# Patient Record
Sex: MALE | Race: ASIAN | NOT HISPANIC OR LATINO
[De-identification: names, ages, dates, MRNs, and addresses within clinical notes are randomized per-mention and may not be internally consistent; named-entity substitution may affect disease eponyms.]

---

## 2021-10-07 PROBLEM — Z00.00 ENCOUNTER FOR PREVENTIVE HEALTH EXAMINATION: Status: ACTIVE | Noted: 2021-10-07

## 2021-10-12 ENCOUNTER — APPOINTMENT (OUTPATIENT)
Dept: SURGERY | Facility: CLINIC | Age: 76
End: 2021-10-12
Payer: MEDICARE

## 2021-10-12 VITALS
SYSTOLIC BLOOD PRESSURE: 114 MMHG | DIASTOLIC BLOOD PRESSURE: 75 MMHG | BODY MASS INDEX: 23.04 KG/M2 | HEIGHT: 63 IN | HEART RATE: 79 BPM | OXYGEN SATURATION: 98 % | WEIGHT: 130 LBS | RESPIRATION RATE: 18 BRPM | TEMPERATURE: 97.8 F

## 2021-10-12 PROCEDURE — 99204 OFFICE O/P NEW MOD 45 MIN: CPT

## 2021-10-12 NOTE — HISTORY OF PRESENT ILLNESS
[de-identified] : Serbian translation via Rikki Sheehan\par \par Patient is a pleasant 76 year old male with no PMHx, distant PSHx of hemorrhoidectomy, who presents for symptomatic reducible left inguinal hernia that has been present for the past 2 years. States that due to the COVID pandemic, he has deferred treatment till now. States that the primary symptom is discomfort, worse with heavy lifting and straining. He is otherwise tolerating diet well, has normal bowel and urinary function. He denies smoking/EtOH/drug use, and has NKDA.

## 2021-10-12 NOTE — PHYSICAL EXAM
[Respiratory Effort] : normal respiratory effort [Normal Rate and Rhythm] : normal rate and rhythm [Alert] : alert [Oriented to Person] : oriented to person [Oriented to Place] : oriented to place [Oriented to Time] : oriented to time [Calm] : calm [de-identified] : NAD [de-identified] : Soft, non-distended, non-TTP in all quadrants, no rebound/guarding [de-identified] : Testes in normal anatomic position, no palpable mass, reducible left inguinal hernia, no palpable right inguinal hernia [de-identified] : No skin changes to the inguinal region

## 2021-10-12 NOTE — PLAN
[FreeTextEntry1] : \par - I spoke with the patient regarding his diagnosis and offered him robotic-assisted laparoscopic left, possible right, inguinal hernia repair with mesh, possible open\par - We discussed the indications, risks, benefits, and alternatives of the procedure, including the use of mesh, to which the patient stated he understood, gave consent, and all his questions were answered\par - Patient to follow with Dr. Gracia for medical risk stratification\par - Patient advised that should he have signs/symptoms of incarceration/obstruction, to immediately go to the ED, to which he stated he understood and would do so\par - All his questions were answered

## 2021-12-03 ENCOUNTER — OUTPATIENT (OUTPATIENT)
Dept: OUTPATIENT SERVICES | Facility: HOSPITAL | Age: 76
LOS: 1 days | Discharge: HOME | End: 2021-12-03
Payer: MEDICARE

## 2021-12-03 VITALS
TEMPERATURE: 98 F | DIASTOLIC BLOOD PRESSURE: 84 MMHG | WEIGHT: 132.28 LBS | HEART RATE: 84 BPM | RESPIRATION RATE: 16 BRPM | OXYGEN SATURATION: 96 % | SYSTOLIC BLOOD PRESSURE: 136 MMHG | HEIGHT: 62 IN

## 2021-12-03 VITALS
HEART RATE: 72 BPM | DIASTOLIC BLOOD PRESSURE: 84 MMHG | TEMPERATURE: 98 F | RESPIRATION RATE: 16 BRPM | WEIGHT: 132.28 LBS | HEIGHT: 62 IN | SYSTOLIC BLOOD PRESSURE: 136 MMHG

## 2021-12-03 DIAGNOSIS — K40.90 UNILATERAL INGUINAL HERNIA, WITHOUT OBSTRUCTION OR GANGRENE, NOT SPECIFIED AS RECURRENT: ICD-10-CM

## 2021-12-03 DIAGNOSIS — Z01.812 ENCOUNTER FOR PREPROCEDURAL LABORATORY EXAMINATION: ICD-10-CM

## 2021-12-03 DIAGNOSIS — Z98.890 OTHER SPECIFIED POSTPROCEDURAL STATES: Chronic | ICD-10-CM

## 2021-12-03 DIAGNOSIS — Z01.818 ENCOUNTER FOR OTHER PREPROCEDURAL EXAMINATION: ICD-10-CM

## 2021-12-03 LAB
ANION GAP SERPL CALC-SCNC: 3 MMOL/L — LOW (ref 5–17)
APTT BLD: 37.9 SEC — HIGH (ref 27.5–35.5)
BUN SERPL-MCNC: 22 MG/DL — SIGNIFICANT CHANGE UP (ref 7–23)
CALCIUM SERPL-MCNC: 9.1 MG/DL — SIGNIFICANT CHANGE UP (ref 8.5–10.1)
CHLORIDE SERPL-SCNC: 107 MMOL/L — SIGNIFICANT CHANGE UP (ref 96–108)
CO2 SERPL-SCNC: 30 MMOL/L — SIGNIFICANT CHANGE UP (ref 22–31)
CREAT SERPL-MCNC: 0.77 MG/DL — SIGNIFICANT CHANGE UP (ref 0.5–1.3)
GLUCOSE SERPL-MCNC: 99 MG/DL — SIGNIFICANT CHANGE UP (ref 70–99)
HCT VFR BLD CALC: 43.5 % — SIGNIFICANT CHANGE UP (ref 39–50)
HGB BLD-MCNC: 14.3 G/DL — SIGNIFICANT CHANGE UP (ref 13–17)
INR BLD: 1.02 RATIO — SIGNIFICANT CHANGE UP (ref 0.88–1.16)
MCHC RBC-ENTMCNC: 30.2 PG — SIGNIFICANT CHANGE UP (ref 27–34)
MCHC RBC-ENTMCNC: 32.9 GM/DL — SIGNIFICANT CHANGE UP (ref 32–36)
MCV RBC AUTO: 92 FL — SIGNIFICANT CHANGE UP (ref 80–100)
NRBC # BLD: 0 /100 WBCS — SIGNIFICANT CHANGE UP (ref 0–0)
PLATELET # BLD AUTO: 280 K/UL — SIGNIFICANT CHANGE UP (ref 150–400)
POTASSIUM SERPL-MCNC: 4.2 MMOL/L — SIGNIFICANT CHANGE UP (ref 3.5–5.3)
POTASSIUM SERPL-SCNC: 4.2 MMOL/L — SIGNIFICANT CHANGE UP (ref 3.5–5.3)
PROTHROM AB SERPL-ACNC: 11.8 SEC — SIGNIFICANT CHANGE UP (ref 10.6–13.6)
RBC # BLD: 4.73 M/UL — SIGNIFICANT CHANGE UP (ref 4.2–5.8)
RBC # FLD: 12.6 % — SIGNIFICANT CHANGE UP (ref 10.3–14.5)
SODIUM SERPL-SCNC: 140 MMOL/L — SIGNIFICANT CHANGE UP (ref 135–145)
WBC # BLD: 6.88 K/UL — SIGNIFICANT CHANGE UP (ref 3.8–10.5)
WBC # FLD AUTO: 6.88 K/UL — SIGNIFICANT CHANGE UP (ref 3.8–10.5)

## 2021-12-03 PROCEDURE — 93010 ELECTROCARDIOGRAM REPORT: CPT

## 2021-12-03 NOTE — H&P PST ADULT - ATTENDING COMMENTS
I spoke with the patient using Vietnamese  service regarding the plan to perform the robotic-assisted laparoscopic left, possible right, inguinal hernia repair with mesh, possible open. We discussed the indications, risks, benefits, and alternatives of the procedure to which the patient stated he understood, gave consent, and all his questions were answered.

## 2021-12-03 NOTE — H&P PST ADULT - ASSESSMENT
77 y/o male patient with PMH BPH, HIgh Cholesterol, with Left inguinal hernia.   He is scheduled for Left inguinal hernia repair, possible open on Dec 13th 77 y/o male patient with PMH BPH, HIgh Cholesterol, with Left inguinal hernia.   A/P  He is scheduled for Left inguinal hernia repair, possible open on Dec 13th  Medical clearance

## 2021-12-03 NOTE — H&P PST ADULT - HISTORY OF PRESENT ILLNESS
76 year old male patient PMH BPH, and high cholesterol who states he has had a left inguinal hernia for many years that has been causes him pain. He had seen his medical doctor who referred him to the surgeon. He states the pain is in the left groin does not radiate, 5-6 on pain scale. He does not take any medicine for it. He uses natural remedy of acupuncture to manage his pain.  He can palpate the hernia, and states he reduces it and feels relief.    He is scheduled for a Robotic assisted laparoscopic of left inguinal hernia with mesh possible open  on 12/13/21

## 2021-12-08 LAB — SARS-COV-2 N GENE NPH QL NAA+PROBE: NOT DETECTED

## 2021-12-09 ENCOUNTER — TRANSCRIPTION ENCOUNTER (OUTPATIENT)
Age: 76
End: 2021-12-09

## 2021-12-10 ENCOUNTER — OUTPATIENT (OUTPATIENT)
Dept: OUTPATIENT SERVICES | Facility: HOSPITAL | Age: 76
LOS: 1 days | Discharge: HOME | End: 2021-12-10
Payer: MEDICARE

## 2021-12-10 ENCOUNTER — APPOINTMENT (OUTPATIENT)
Dept: SURGERY | Facility: HOSPITAL | Age: 76
End: 2021-12-10

## 2021-12-10 VITALS
RESPIRATION RATE: 17 BRPM | WEIGHT: 143.3 LBS | TEMPERATURE: 98 F | SYSTOLIC BLOOD PRESSURE: 110 MMHG | HEART RATE: 78 BPM | OXYGEN SATURATION: 99 % | DIASTOLIC BLOOD PRESSURE: 72 MMHG

## 2021-12-10 VITALS
DIASTOLIC BLOOD PRESSURE: 62 MMHG | OXYGEN SATURATION: 97 % | RESPIRATION RATE: 15 BRPM | HEART RATE: 80 BPM | SYSTOLIC BLOOD PRESSURE: 115 MMHG

## 2021-12-10 DIAGNOSIS — Z98.890 OTHER SPECIFIED POSTPROCEDURAL STATES: Chronic | ICD-10-CM

## 2021-12-10 PROCEDURE — S2900 ROBOTIC SURGICAL SYSTEM: CPT | Mod: NC

## 2021-12-10 PROCEDURE — 49650 LAP ING HERNIA REPAIR INIT: CPT | Mod: 50

## 2021-12-10 RX ORDER — ACETAMINOPHEN 500 MG
0 TABLET ORAL
Qty: 0 | Refills: 0 | DISCHARGE

## 2021-12-10 RX ORDER — FENTANYL CITRATE 50 UG/ML
25 INJECTION INTRAVENOUS
Refills: 0 | Status: DISCONTINUED | OUTPATIENT
Start: 2021-12-10 | End: 2021-12-11

## 2021-12-10 RX ORDER — OXYCODONE HYDROCHLORIDE 5 MG/1
1 TABLET ORAL
Qty: 15 | Refills: 0
Start: 2021-12-10

## 2021-12-10 RX ORDER — SODIUM CHLORIDE 9 MG/ML
3 INJECTION INTRAMUSCULAR; INTRAVENOUS; SUBCUTANEOUS EVERY 8 HOURS
Refills: 0 | Status: DISCONTINUED | OUTPATIENT
Start: 2021-12-10 | End: 2021-12-10

## 2021-12-10 RX ORDER — OXYCODONE HYDROCHLORIDE 5 MG/1
1 TABLET ORAL
Qty: 16 | Refills: 0
Start: 2021-12-10

## 2021-12-10 RX ORDER — TAMSULOSIN HYDROCHLORIDE 0.4 MG/1
0.4 CAPSULE ORAL ONCE
Refills: 0 | Status: COMPLETED | OUTPATIENT
Start: 2021-12-10 | End: 2021-12-10

## 2021-12-10 RX ORDER — SODIUM CHLORIDE 9 MG/ML
1000 INJECTION, SOLUTION INTRAVENOUS
Refills: 0 | Status: DISCONTINUED | OUTPATIENT
Start: 2021-12-10 | End: 2021-12-11

## 2021-12-10 RX ORDER — ONDANSETRON 8 MG/1
4 TABLET, FILM COATED ORAL ONCE
Refills: 0 | Status: COMPLETED | OUTPATIENT
Start: 2021-12-10 | End: 2021-12-10

## 2021-12-10 RX ADMIN — FENTANYL CITRATE 25 MICROGRAM(S): 50 INJECTION INTRAVENOUS at 16:25

## 2021-12-10 RX ADMIN — ONDANSETRON 4 MILLIGRAM(S): 8 TABLET, FILM COATED ORAL at 17:10

## 2021-12-10 RX ADMIN — SODIUM CHLORIDE 75 MILLILITER(S): 9 INJECTION, SOLUTION INTRAVENOUS at 15:59

## 2021-12-10 RX ADMIN — FENTANYL CITRATE 25 MICROGRAM(S): 50 INJECTION INTRAVENOUS at 16:39

## 2021-12-10 RX ADMIN — TAMSULOSIN HYDROCHLORIDE 0.4 MILLIGRAM(S): 0.4 CAPSULE ORAL at 20:14

## 2021-12-10 RX ADMIN — FENTANYL CITRATE 25 MICROGRAM(S): 50 INJECTION INTRAVENOUS at 16:10

## 2021-12-10 NOTE — ASU DISCHARGE PLAN (ADULT/PEDIATRIC) - CARE PROVIDER_API CALL
Edmundo Capps (MD)  Surgery  [address], [address]nd [place] [address]  Phone: [phone]  Fax: [phone]  Follow Up Time:    Edmundo Capps)  Surgery  [address] [facility], [address]nd [place] [address]  Phone: [phone]  Fax: [phone]  Follow Up Time:     Damien Marino)  Urology  [address] [place] [address], [place] [address]  Phone: [phone]  Fax: [phone]  Follow Up Time:

## 2021-12-10 NOTE — ASU DISCHARGE PLAN (ADULT/PEDIATRIC) - COMMENTS
Follow up with Dr. Marino for sanchez catheter removal next week. May follow up in his [place] office.   [address]  [place], [place] [id number] [phone] Follow up with Dr. Marino for sanchez catheter removal next week. May follow up in his [place] office.   [address]  [place], [place] [id number] [phone]

## 2021-12-10 NOTE — ASU DISCHARGE PLAN (ADULT/PEDIATRIC) - PROVIDER TOKENS
PROVIDER:[TOKEN:[[id number]]] PROVIDER:[TOKEN:[[id number]]],PROVIDER:[TOKEN:[[id number]]]

## 2021-12-10 NOTE — ASU DISCHARGE PLAN (ADULT/PEDIATRIC) - NS MD DC FALL RISK RISK
For information on Fall & Injury Prevention, visit: [email or web address] OR  [email or web address] OR  https://www.cdc.gov/steadi/patient.html

## 2021-12-10 NOTE — ASU DISCHARGE PLAN (ADULT/PEDIATRIC) - ASU DC SPECIAL INSTRUCTIONSFT
PAIN: Take over the counter Tylenol 1000mg Q6, oxycodone for breakthrough   WOUND: Please keep incisions clean and dry. Please do not scrub or rub incisions. Please to do not apply lotion or powder on incisions.   BATH: Please do not submerge wound under water. You may shower or use sponge bath.  ACTIVITY: No heavy lifting or straining until your follow up appointment. Otherwise, you may return to your usual level of physical activity. If you are taking narcotic pain medication (such as Oxycodone) DO NOT drive a car, operate machinery or make important decisions.  DIET: Return to your usual diet.  NOTIFY YOUR SURGEON IF: You have any bleeding that does not stop, any pus draining from your wound(s), any fever (over 100.4 F) or chills, persistent nausea/vomiting, persistent diarrhea, or if your pain is not controlled on your discharge pain medications.  FOLLOW-UP: Please follow-up with your surgeon, within 1-2 weeks following discharge- please call to schedule an appointment.

## 2021-12-10 NOTE — CHART NOTE - NSCHARTNOTEFT_GEN_A_CORE
Notified by RN that patient is not able to void. Bladder scan:265. Pt seen and examined at bedside. Pt has the urge to urinate but denies pain. He does not have a urologist. Pt takes Flomax at home for BPH. Pt agreeable to sanchez catheter placement. Denies pain, nausea, vomiting, fever/chills, cp, sob, dizziness. Luxembourgish  used #[id number] Blanca.   Flomax given in PACU. Sanchez ordered. Discussed with patient and his son to follow up with urologist Dr. Damien Marino in [place] for follow catheter removal. Discussed with Dr. Capps. Notified by RN that patient is not able to void. Bladder scan:265. Pt seen and examined at bedside. Pt has the urge to urinate but denies pain. He does not have a urologist. Pt takes Flomax at home for BPH. Pt agreeable to sanchez catheter placement. Denies pain, nausea, vomiting, fever/chills, cp, sob, dizziness. Occitan  used #[id number] Blanca.   Flomax given in PACU. Sanchez ordered. Continue Flomax at home. Discussed with patient and his son to follow up with urologist Dr. Damien Marino in [place] for follow catheter removal. Discussed with Dr. Capps. Notified by RN that patient is not able to void. Bladder scan:265. Pt seen and examined at bedside. Pt has the urge to urinate but denies pain. He does not have a urologist. Pt takes Flomax at home for BPH. Pt agreeable to sanchez catheter placement. Denies pain, nausea, vomiting, fever/chills, cp, sob, dizziness. Arabic  used #[id number] Blanca.   Flomax given in PACU. Sanchez ordered. Continue Flomax at home. Discussed with patient and his son to follow up with urologist Dr. Damien Marino in [place] for sanchez catheter removal. Discussed with Dr. Capps.

## 2021-12-10 NOTE — ASU PATIENT PROFILE, ADULT - FALL HARM RISK - UNIVERSAL INTERVENTIONS
Bed in lowest position, wheels locked, appropriate side rails in place/Call bell, personal items and telephone in reach/Instruct patient to call for assistance before getting out of bed or chair/Non-slip footwear when patient is out of bed/[place] to call system/Physically safe environment - no spills, clutter or unnecessary equipment/Purposeful Proactive Rounding/Room/bathroom lighting operational, light cord in reach

## 2021-12-13 PROBLEM — N40.0 BENIGN PROSTATIC HYPERPLASIA WITHOUT LOWER URINARY TRACT SYMPTOMS: Chronic | Status: ACTIVE | Noted: 2021-12-03

## 2021-12-13 PROBLEM — E78.00 PURE HYPERCHOLESTEROLEMIA, UNSPECIFIED: Chronic | Status: ACTIVE | Noted: 2021-12-03

## 2021-12-14 ENCOUNTER — APPOINTMENT (OUTPATIENT)
Dept: UROLOGY | Facility: CLINIC | Age: 76
End: 2021-12-14
Payer: MEDICARE

## 2021-12-14 VITALS
DIASTOLIC BLOOD PRESSURE: 72 MMHG | OXYGEN SATURATION: 98 % | HEART RATE: 80 BPM | WEIGHT: 135 LBS | SYSTOLIC BLOOD PRESSURE: 119 MMHG | BODY MASS INDEX: 23.05 KG/M2 | HEIGHT: 64 IN | RESPIRATION RATE: 16 BRPM | TEMPERATURE: 98.4 F

## 2021-12-14 DIAGNOSIS — E78.00 PURE HYPERCHOLESTEROLEMIA, UNSPECIFIED: ICD-10-CM

## 2021-12-14 DIAGNOSIS — R33.8 OTHER RETENTION OF URINE: ICD-10-CM

## 2021-12-14 DIAGNOSIS — K40.90 UNILATERAL INGUINAL HERNIA, W/OUT OBSTRUCTION OR GANGRENE, NOT SPECIFIED AS RECURRENT: ICD-10-CM

## 2021-12-14 DIAGNOSIS — Z78.9 OTHER SPECIFIED HEALTH STATUS: ICD-10-CM

## 2021-12-14 DIAGNOSIS — K40.90 UNILATERAL INGUINAL HERNIA, WITHOUT OBSTRUCTION OR GANGRENE, NOT SPECIFIED AS RECURRENT: ICD-10-CM

## 2021-12-14 DIAGNOSIS — K40.20 BILATERAL INGUINAL HERNIA, WITHOUT OBSTRUCTION OR GANGRENE, NOT SPECIFIED AS RECURRENT: ICD-10-CM

## 2021-12-14 PROCEDURE — 51798 US URINE CAPACITY MEASURE: CPT

## 2021-12-14 PROCEDURE — 51700 IRRIGATION OF BLADDER: CPT

## 2021-12-14 PROCEDURE — A4218: CPT | Mod: NC

## 2021-12-14 PROCEDURE — 99204 OFFICE O/P NEW MOD 45 MIN: CPT | Mod: 25

## 2021-12-14 NOTE — ADDENDUM
[FreeTextEntry1] : Entered by Katheryn Villela, acting as scribe for Dr. Damien Marino.\par \par The documentation recorded by the scribe accurately reflects the service I personally performed and the decisions made by me.

## 2021-12-14 NOTE — HISTORY OF PRESENT ILLNESS
[FreeTextEntry1] : Please refer to URO Consult note \par \par new male pt \par urinary retention after hernia surgery \par voiding trial today \par passed voiding trial \par PVR is 150 \par start Flomax \par come back on Friday \par

## 2021-12-14 NOTE — LETTER BODY
[FreeTextEntry1] : Francisco Javier Gracia MD\par [address], \[place] [address]\par [phone]\par \par Dear Dr. Gracia \par \par Reason for Visit: Urinary retention. \par \par This is a 76 year-old Mohawk-speaking gentleman with urinary retention. Patient is here today for evaluation. The patient recently underwent inguinal hernia repair with Dr. Edmundo Capps. Patient developed urinary retention after surgery and could not urinate. The patient will undergo voiding trial today. He denies any hematuria or urinary incontinence. His symptoms are aggravated by hydration. He denies any alleviating factors. He denies any pain. All other review of systems are negative. He has no cancer in his family medical history. He has previous surgical history. Past medical history, family history and social history were inquired and were noncontributory to current condition. The patient does not use tobacco or drink alcohol. Medications and allergies were reviewed. He has no known allergies to medication. \par \par On examination, the patient is a healthy-appearing gentleman in no acute distress. He is alert and oriented follows commands. He has normal mood and affect. He is normocephalic. Neck is supple. Oral no thrush Respirations are unlabored. His abdomen is soft and nontender. Bladder is nonpalpable. No CVA tenderness. Neurologically he is grossly intact. No peripheral edema. Skin without gross abnormality.\par \par Patient was given a voiding trial today. The patient's bladder was filled with 300 cc of water. Patient was able to void spontaneously.\par \par Post-void residual on bladder scan today was 150 cc.\par \par ASSESSMENT: Urinary retention, resolved\par \par I counseled the patient. He passed his voiding trial today. His PVR was 150 cc. I recommended the patient begin a trial of Flomax QD. I discussed the potential side effects of the medication. I counseled the patient on its use and side effects. If the patient develops any side effects, the patient will discontinue the medication and contact me. The patient will return to the office on Friday. Risks and alternatives were discussed. I answered the patient questions. The patient will follow-up as directed and will contact me with any questions or concerns. Thank you for the opportunity to participate in the care of Mr. PACK. I will keep you updated on his progress. \par \par Plan: Trial of Flomax QD. RTO on Friday.

## 2021-12-17 ENCOUNTER — APPOINTMENT (OUTPATIENT)
Dept: UROLOGY | Facility: CLINIC | Age: 76
End: 2021-12-17
Payer: MEDICARE

## 2021-12-17 VITALS
RESPIRATION RATE: 16 BRPM | TEMPERATURE: 98.4 F | WEIGHT: 132 LBS | DIASTOLIC BLOOD PRESSURE: 80 MMHG | SYSTOLIC BLOOD PRESSURE: 132 MMHG | BODY MASS INDEX: 22.66 KG/M2 | HEART RATE: 82 BPM | OXYGEN SATURATION: 97 %

## 2021-12-17 PROCEDURE — 51798 US URINE CAPACITY MEASURE: CPT

## 2021-12-17 PROCEDURE — 99213 OFFICE O/P EST LOW 20 MIN: CPT

## 2021-12-17 RX ORDER — TAMSULOSIN HYDROCHLORIDE 0.4 MG/1
0.4 CAPSULE ORAL
Qty: 90 | Refills: 3 | Status: ACTIVE | COMMUNITY
Start: 2021-12-14 | End: 1900-01-01

## 2021-12-17 NOTE — LETTER BODY
[FreeTextEntry1] : Francisco Javier Gracia MD\par [address], \[place] [address]\par [phone]\par \par Dear Dr. Gracia, \par \par Reason for Visit: Urinary retention. \par \par This is a 76 year-old Luxembourgish-speaking gentleman with urinary retention. The patient recently underwent inguinal hernia repair with Dr. Edmundo Capps. Patient developed urinary retention after surgery and could not urinate. He passed his recent voiding trial. The patient returns today for follow up. Since he was last seen, the patient reports taking Flomax QD regularly without any side effects or difficulties with medication. The patient reports improved urinary symptoms. He denies retention.  He denies any hematuria or urinary incontinence. He denies any pain. All other review of systems are negative. Past medical history, family history and social history were inquired and were noncontributory to current condition. The patient does not use tobacco or drink alcohol. Medications and allergies were reviewed. He has no known allergies to medication. \par \par On examination, the patient is a healthy-appearing gentleman in no acute distress. He is alert and oriented follows commands. He has normal mood and affect. He is normocephalic. Neck is supple. Oral no thrush Respirations are unlabored. His abdomen is soft and nontender. Bladder is nonpalpable. No CVA tenderness. Neurologically he is grossly intact. No peripheral edema. Skin without gross abnormality.\par \par Post-void residual on bladder scan today was 160 cc, which is stable. His previous PVR was 150 cc. \par \par ASSESSMENT: Urinary retention, resolved\par \par I counseled the patient. The patient is doing well.  His PVR was 160 cc, which is stable. I recommended the patient continue taking Flomax QD. I renewed the patient's prescription for Flomax QD today. I encouraged the patient to continue medications regularly as directed. He will follow up in 6 months to ensure stability. Patient understands that if he develops gross hematuria or any urinary discomfort, he will contact me for further evaluation.  Risks and alternatives were discussed. I answered the patient questions. The patient will follow-up as directed and will contact me with any questions or concerns. Thank you for the opportunity to participate in the care of Mr. CERVANTES. I will keep you updated on his progress. \par \par Plan:  Continue Flomax QD. Follow up in 6 months.

## 2021-12-17 NOTE — HISTORY OF PRESENT ILLNESS
[FreeTextEntry1] : Please refer to URO Consult note \par \par FUA retention \par PVR today 160 cc \par stable \par continue meds \par come back in 6 months \par

## 2022-01-04 ENCOUNTER — APPOINTMENT (OUTPATIENT)
Dept: SURGERY | Facility: CLINIC | Age: 77
End: 2022-01-04
Payer: MEDICARE

## 2022-01-04 VITALS
DIASTOLIC BLOOD PRESSURE: 78 MMHG | OXYGEN SATURATION: 99 % | RESPIRATION RATE: 18 BRPM | HEART RATE: 67 BPM | BODY MASS INDEX: 23.34 KG/M2 | WEIGHT: 136 LBS | TEMPERATURE: 97.9 F | SYSTOLIC BLOOD PRESSURE: 126 MMHG

## 2022-01-04 PROCEDURE — 99024 POSTOP FOLLOW-UP VISIT: CPT

## 2022-01-04 NOTE — HISTORY OF PRESENT ILLNESS
[de-identified] : Luxembourgish translation via Rikki Sheehan\par \par Patient is a 76 year old male who underwent robotic-assisted laparoscopic bilateral inguinal hernia repair with mesh on 12/10/2021, who presents for postoperative forllow up. As per patient, he denies pain, states that he is having normal urinary/bowel function, denies fever/chills, no N/V.\par \par Examination of the abdomen reveals well-healing incisions, no incisional hernia, testes in normal anatomic position, no recurrent inguinal hernias B/L, no overlying skin chagnes\par \par Patient is healing well and can return to normal activity. RTC PRN

## 2025-05-28 NOTE — H&P PST ADULT - INTERPRETER'S NAME
Patient c/o rectal  pain, constipation past 3 days, Last normal BM 4 days ago, now small amts, C/o pain in rectum, no abd pain, no fever, no N/V, no dysuria, Bridger Cuevas